# Patient Record
Sex: FEMALE | Race: WHITE | NOT HISPANIC OR LATINO | Employment: FULL TIME | URBAN - METROPOLITAN AREA
[De-identification: names, ages, dates, MRNs, and addresses within clinical notes are randomized per-mention and may not be internally consistent; named-entity substitution may affect disease eponyms.]

---

## 2018-08-29 ENCOUNTER — HOSPITAL ENCOUNTER (EMERGENCY)
Facility: HOSPITAL | Age: 38
Discharge: HOME | End: 2018-08-29
Attending: EMERGENCY MEDICINE | Admitting: EMERGENCY MEDICINE

## 2018-08-29 VITALS
BODY MASS INDEX: 36.8 KG/M2 | SYSTOLIC BLOOD PRESSURE: 136 MMHG | HEIGHT: 62 IN | WEIGHT: 200 LBS | HEART RATE: 98 BPM | RESPIRATION RATE: 16 BRPM | DIASTOLIC BLOOD PRESSURE: 88 MMHG | OXYGEN SATURATION: 99 % | TEMPERATURE: 99.5 F

## 2018-08-29 DIAGNOSIS — G62.9 PERIPHERAL POLYNEUROPATHY: Primary | ICD-10-CM

## 2018-08-29 PROCEDURE — 99282 EMERGENCY DEPT VISIT SF MDM: CPT

## 2018-08-29 RX ORDER — NAPROXEN 500 MG/1
500 TABLET ORAL 2 TIMES DAILY WITH MEALS
Qty: 10 TABLET | Refills: 0 | Status: SHIPPED | OUTPATIENT
Start: 2018-08-29 | End: 2018-09-05

## 2018-09-05 ENCOUNTER — OFFICE VISIT (OUTPATIENT)
Dept: NEUROLOGY | Facility: CLINIC | Age: 38
End: 2018-09-05

## 2018-09-05 VITALS
BODY MASS INDEX: 36.8 KG/M2 | HEIGHT: 62 IN | DIASTOLIC BLOOD PRESSURE: 82 MMHG | WEIGHT: 200 LBS | SYSTOLIC BLOOD PRESSURE: 130 MMHG

## 2018-09-05 DIAGNOSIS — G56.03 BILATERAL CARPAL TUNNEL SYNDROME: Primary | ICD-10-CM

## 2018-09-05 DIAGNOSIS — R20.2 PARESTHESIAS: ICD-10-CM

## 2018-09-05 DIAGNOSIS — G56.23 ULNAR NEUROPATHY OF BOTH UPPER EXTREMITIES: ICD-10-CM

## 2018-09-05 PROCEDURE — 99204 OFFICE O/P NEW MOD 45 MIN: CPT | Performed by: PSYCHIATRY & NEUROLOGY

## 2018-09-05 RX ORDER — GABAPENTIN 300 MG/1
300 CAPSULE ORAL NIGHTLY
Qty: 30 CAPSULE | Refills: 1 | Status: SHIPPED | OUTPATIENT
Start: 2018-09-05

## 2018-09-05 NOTE — PROGRESS NOTES
Subjective:    CC: Anisa Granado is seen today in consultation at the request of  for Peripheral Neuropathy       HPI:  Patient is a 38-year-old female without any significant past medical history referred to the clinic for evaluation of the tingling and numbness involving both hands and arms.  She reports that she started having numbness involving both the hands approximately 3 years ago and it has progressively become worse.  She reports that the numbness of her started involving digits 1,2, 3 in both the hands and now it's also involving digits 4 and 5 bilaterally.  It wakes her up at night and she has to dangle her hands to get relief from tingling and numbness.  She also has lately started noticing dropping things from both her hands.  She has never been evaluated for carpal tunnel syndrome.  She does report that she uses wrist brace at night and it helps somewhat.  She also reports that the tingling numbness and pain sometimes radiate up for stroke worse the the elbow or even towards the shoulder.  She denies any problems with bowel or bladder control.  She denies any neck pain.      The following portions of the patient's history were reviewed today and updated as of 2018  : allergies, social history and problem list.  This document will be scanned to patient's chart.      Current Outpatient Prescriptions:   •  gabapentin (NEURONTIN) 300 MG capsule, Take 1 capsule by mouth Every Night., Disp: 30 capsule, Rfl: 1   Past Medical History:   Diagnosis Date   • Carpal tunnel syndrome       Past Surgical History:   Procedure Laterality Date   •  SECTION     • KNEE MENISCAL REPAIR Left    • MOUTH SURGERY        No family history on file.   Review of Systems   HENT: Negative.    Eyes: Negative.    Respiratory: Negative.    Cardiovascular: Negative.    Musculoskeletal: Negative.    Skin: Negative.    Allergic/Immunologic: Negative.    Neurological: Positive for weakness and numbness.  "  Psychiatric/Behavioral: Negative.        All other systems reviewed and are negative     Objective:    /82   Ht 157.5 cm (62.01\")   Wt 90.7 kg (200 lb)   BMI 36.57 kg/m²     Neurology Exam:    General apperance: NAD.     Mental status: Alert, awake and oriented to time place and person.    Recent and Remote memory: Can recall 3/3 objects at 5 minutes. Can recall historical events.     Attention span and Concentration: Serial 7s: Normal.     Fund of knowledge:  Normal.     Language and Speech: No aphasia or dysarthria.    Naming , Repitition and Comprehension:  Can name objects, repeat a sentence and follow commands. Speech is clear and fluent with good repetition, comprehension, and naming.    Cranial Nerves:   CN II: Visual fields are full. Intact. Fundi - Normal, No papillederma, Pupils - MICHAEL  CN III, IV and VI: Extraocular movements are intact. Normal saccades.   CN V: Facial sensation is intact.   CN VII: Muscles of facial expression reveal no asymmetry. Intact.   CN VIII: Hearing is intact. Whispered voice intact.   CN IX and X: Palate elevates symmetrically. Intact  CN XI: Shoulder shrug is intact.   CN XII: Tongue is midline without evidence of atrophy or fasciculation.     Motor:  Right UE muscle strength 5/5. Normal tone.     Left UE muscle strength 5/5. Normal tone.      Right LE muscle strength5/5. Normal tone.     Left LE muscle strength 5/5. Normal tone.      Sensory: His light touch and pinprick sensation in digits 1 through 5 bilaterally.    DTRs: 3+ bilaterally in upper extremities and 2+ bilaterally in lower extremities.  Positive Phalen's and Tinel's test -Both the wrist and elbow.    Babinski: Negative bilaterally.    Co-ordination: Normal finger-to-nose, heel to shin B/L.    Rhomberg: Negative.    Gait: Normal.    Cardiovascular: Regular rate and rhythm without murmur, gallop or rub.    Assessment and Plan:  1. Bilateral carpal tunnel syndrome  Based on patient's symptoms, " possibility of carpal tunnel syndrome is the highly likely.  She'll be scheduled for EMG/nerve conduction study to determine severity.  Based on the results of the test, further treatment options will be discussed with the patient.  Until then, I've advised her to continue using wrist brace and I'll be also starting her on gabapentin 300 mg at Bedtime for symptomatically relief.- EMG & Nerve Conduction Test; Future    2. Ulnar neuropathy of both upper extremities  Ulnar neuropathy bilaterally.  EMG/nerve conduction study to confirm the diagnosis and to determine severity.  Based on the results, further treatment options will be discussed with the patient.  - EMG & Nerve Conduction Test; Future    3. Paresthesias  She was found to have a hyperreflexia in both her arms.  MRI cervical spine will be ordered the to rule out possibility of myelopathy.  - MRI Cervical Spine Without Contrast; Future       Return in about 4 weeks (around 10/3/2018).     Paxton Jackson MD

## 2018-09-14 ENCOUNTER — HOSPITAL ENCOUNTER (OUTPATIENT)
Dept: MRI IMAGING | Facility: HOSPITAL | Age: 38
Discharge: HOME | End: 2018-09-14
Attending: PSYCHIATRY & NEUROLOGY | Admitting: PSYCHIATRY & NEUROLOGY

## 2018-09-14 DIAGNOSIS — R20.2 PARESTHESIAS: ICD-10-CM

## 2018-09-14 PROCEDURE — 72141 MRI NECK SPINE W/O DYE: CPT

## 2018-09-17 ENCOUNTER — TELEPHONE (OUTPATIENT)
Dept: NEUROLOGY | Facility: CLINIC | Age: 38
End: 2018-09-17

## 2018-09-17 NOTE — TELEPHONE ENCOUNTER
----- Message from Paxton Jackson MD sent at 9/17/2018 10:53 AM EDT -----  Please call the patient regarding her abnormal result.  At C4-C5 and at C5-C6, there is disc protrusion however it does not cause pinching of the nerves at this level.  No abnormality within the spinal cord.

## 2018-09-17 NOTE — TELEPHONE ENCOUNTER
Contacted pt notified her of Dr. Jackson's results she verbalized understanding and will cb if needed.

## 2018-10-15 ENCOUNTER — HOSPITAL ENCOUNTER (OUTPATIENT)
Dept: NEUROLOGY | Facility: HOSPITAL | Age: 38
Discharge: HOME | End: 2018-10-15
Attending: PSYCHIATRY & NEUROLOGY | Admitting: PSYCHIATRY & NEUROLOGY

## 2018-10-15 DIAGNOSIS — G56.23 ULNAR NEUROPATHY OF BOTH UPPER EXTREMITIES: ICD-10-CM

## 2018-10-15 DIAGNOSIS — G56.03 BILATERAL CARPAL TUNNEL SYNDROME: ICD-10-CM

## 2018-10-15 PROCEDURE — 95886 MUSC TEST DONE W/N TEST COMP: CPT

## 2018-10-15 PROCEDURE — 95910 NRV CNDJ TEST 7-8 STUDIES: CPT

## 2018-10-16 ENCOUNTER — TELEPHONE (OUTPATIENT)
Dept: NEUROLOGY | Facility: CLINIC | Age: 38
End: 2018-10-16

## 2018-10-16 NOTE — TELEPHONE ENCOUNTER
----- Message from Paxton Jackson MD sent at 10/16/2018 11:49 AM EDT -----  Please call the patient regarding her abnormal result.  EMG/no conduction study shows the mild to moderate the carpal tunnel syndrome on the right and mild carpal tunnel syndrome on the left.  Has wrist brace and gabapentin helped?

## 2018-10-16 NOTE — TELEPHONE ENCOUNTER
I would like her to wear it every day at bedtime throughout the night.  If possible and if she can use it throughout the day it would be great  as well but at least every day at night.

## 2018-10-16 NOTE — TELEPHONE ENCOUNTER
Contacted pt notifed her of results, she said braces help, she is wanting to know if you have any suggestions on  the best time to wear them?     She has not been taking gabapentin.    [phone]